# Patient Record
Sex: MALE | Race: ASIAN | URBAN - METROPOLITAN AREA
[De-identification: names, ages, dates, MRNs, and addresses within clinical notes are randomized per-mention and may not be internally consistent; named-entity substitution may affect disease eponyms.]

---

## 2017-07-31 ENCOUNTER — APPOINTMENT (OUTPATIENT)
Age: 67
Setting detail: DERMATOLOGY
End: 2017-07-31

## 2017-07-31 DIAGNOSIS — D22 MELANOCYTIC NEVI: ICD-10-CM

## 2017-07-31 DIAGNOSIS — B07.8 OTHER VIRAL WARTS: ICD-10-CM

## 2017-07-31 DIAGNOSIS — Z71.89 OTHER SPECIFIED COUNSELING: ICD-10-CM

## 2017-07-31 DIAGNOSIS — L82.1 OTHER SEBORRHEIC KERATOSIS: ICD-10-CM

## 2017-07-31 PROBLEM — D22.5 MELANOCYTIC NEVI OF TRUNK: Status: ACTIVE | Noted: 2017-07-31

## 2017-07-31 PROBLEM — D22.72 MELANOCYTIC NEVI OF LEFT LOWER LIMB, INCLUDING HIP: Status: ACTIVE | Noted: 2017-07-31

## 2017-07-31 PROCEDURE — OTHER LIQUID NITROGEN: OTHER

## 2017-07-31 PROCEDURE — OTHER COUNSELING: OTHER

## 2017-07-31 PROCEDURE — 17110 DESTRUCT B9 LESION 1-14: CPT

## 2017-07-31 PROCEDURE — 99213 OFFICE O/P EST LOW 20 MIN: CPT | Mod: 25

## 2017-07-31 ASSESSMENT — LOCATION ZONE DERM
LOCATION ZONE: ARM
LOCATION ZONE: LEG
LOCATION ZONE: NECK
LOCATION ZONE: TRUNK
LOCATION ZONE: FACE

## 2017-07-31 ASSESSMENT — LOCATION DETAILED DESCRIPTION DERM
LOCATION DETAILED: LEFT CLAVICULAR NECK
LOCATION DETAILED: LEFT INFERIOR CENTRAL MALAR CHEEK
LOCATION DETAILED: INFERIOR THORACIC SPINE
LOCATION DETAILED: LEFT DISTAL DORSAL FOREARM
LOCATION DETAILED: LEFT DISTAL PRETIBIAL REGION
LOCATION DETAILED: LEFT SUPERIOR MEDIAL UPPER BACK
LOCATION DETAILED: EPIGASTRIC SKIN

## 2017-07-31 ASSESSMENT — LOCATION SIMPLE DESCRIPTION DERM
LOCATION SIMPLE: UPPER BACK
LOCATION SIMPLE: LEFT ANTERIOR NECK
LOCATION SIMPLE: LEFT PRETIBIAL REGION
LOCATION SIMPLE: LEFT FOREARM
LOCATION SIMPLE: LEFT UPPER BACK
LOCATION SIMPLE: ABDOMEN
LOCATION SIMPLE: LEFT CHEEK

## 2017-07-31 NOTE — PROCEDURE: LIQUID NITROGEN
Medical Necessity Information: It is in your best interest to select a reason for this procedure from the list below. All of these items fulfill various CMS LCD requirements except the new and changing color options.
Detail Level: Simple
Number Of Freeze-Thaw Cycles: 2 freeze-thaw cycles
Add 52 Modifier (Optional): no
Medical Necessity Clause: This procedure was medically necessary because the lesions that were treated were: irritated
Duration Of Freeze Thaw-Cycle (Seconds): 5
Post-Care Instructions: I reviewed with the patient in detail post-care instructions. Patient is to wear sunprotection, and avoid picking at any of the treated lesions. Pt may apply Vaseline to crusted or scabbing areas.
Include Z78.9 (Other Specified Conditions Influencing Health Status) As An Associated Diagnosis?: Yes
Consent: The patient's consent was obtained including but not limited to risks of crusting, scabbing, blistering, scarring, darker or lighter pigmentary change, recurrence, incomplete removal and infection.

## 2022-02-11 ENCOUNTER — OFFICE VISIT (OUTPATIENT)
Dept: URBAN - METROPOLITAN AREA CLINIC 13 | Facility: CLINIC | Age: 72
End: 2022-02-11
Payer: MEDICARE

## 2022-02-11 DIAGNOSIS — H44.23 DEGENERATIVE MYOPIA, BILATERAL: ICD-10-CM

## 2022-02-11 DIAGNOSIS — Z96.1 PRESENCE OF INTRAOCULAR LENS: ICD-10-CM

## 2022-02-11 DIAGNOSIS — H35.051 RETINAL NEOVASCULARIZATION, UNSPECIFIED, RIGHT EYE: Primary | ICD-10-CM

## 2022-02-11 PROCEDURE — 67028 INJECTION EYE DRUG: CPT | Performed by: OPHTHALMOLOGY

## 2022-02-11 PROCEDURE — 99203 OFFICE O/P NEW LOW 30 MIN: CPT | Performed by: OPHTHALMOLOGY

## 2022-02-11 PROCEDURE — 92134 CPTRZ OPH DX IMG PST SGM RTA: CPT | Performed by: OPHTHALMOLOGY

## 2022-02-11 ASSESSMENT — INTRAOCULAR PRESSURE
OD: 20
OS: 21

## 2022-02-11 NOTE — IMPRESSION/PLAN
Impression: Retinal neovascularization OD Plan: Pt c/o 'smudge' in his vision for several weeks. 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 Discussed Dx and NHx at length. Reviewed RBA of obs vs Lucentis series at length, pt elects to proceed with Lucentis (sample) today. 

4 weeks, photos/FA/ICG OU, Lucentis OD 2/3

## 2022-03-11 ENCOUNTER — OFFICE VISIT (OUTPATIENT)
Dept: URBAN - METROPOLITAN AREA CLINIC 13 | Facility: CLINIC | Age: 72
End: 2022-03-11
Payer: MEDICARE

## 2022-03-11 DIAGNOSIS — H35.81 RETINAL EDEMA: ICD-10-CM

## 2022-03-11 PROCEDURE — 92235 FLUORESCEIN ANGRPH MLTIFRAME: CPT | Performed by: OPHTHALMOLOGY

## 2022-03-11 PROCEDURE — 92134 CPTRZ OPH DX IMG PST SGM RTA: CPT | Performed by: OPHTHALMOLOGY

## 2022-03-11 PROCEDURE — 67028 INJECTION EYE DRUG: CPT | Performed by: OPHTHALMOLOGY

## 2022-03-11 ASSESSMENT — INTRAOCULAR PRESSURE
OD: 15
OS: 18

## 2022-03-11 NOTE — IMPRESSION/PLAN
Impression: Retinal neovascularization OD Plan: Pt returns for ancillary testing and Tx. Photos 3/11/22 showed myopic CNVM with heme/SHRM/IRF OD. 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 ICG showed classic CNVM. Proceed with Lucentis 2/3 today. 

4 weeks, Lucentis OD 3/3

## 2022-04-08 ENCOUNTER — PROCEDURE (OUTPATIENT)
Dept: URBAN - METROPOLITAN AREA CLINIC 13 | Facility: CLINIC | Age: 72
End: 2022-04-08
Payer: MEDICARE

## 2022-04-08 PROCEDURE — 67028 INJECTION EYE DRUG: CPT | Performed by: OPHTHALMOLOGY

## 2022-04-08 ASSESSMENT — INTRAOCULAR PRESSURE
OD: 16
OS: 17

## 2022-05-20 ENCOUNTER — OFFICE VISIT (OUTPATIENT)
Dept: URBAN - METROPOLITAN AREA CLINIC 13 | Facility: CLINIC | Age: 72
End: 2022-05-20
Payer: MEDICARE

## 2022-05-20 DIAGNOSIS — H35.81 RETINAL EDEMA: ICD-10-CM

## 2022-05-20 DIAGNOSIS — H35.051 RETINAL NEOVASCULARIZATION, UNSPECIFIED, RIGHT EYE: Primary | ICD-10-CM

## 2022-05-20 DIAGNOSIS — H44.23 DEGENERATIVE MYOPIA, BILATERAL: ICD-10-CM

## 2022-05-20 PROCEDURE — 99212 OFFICE O/P EST SF 10 MIN: CPT | Performed by: OPHTHALMOLOGY

## 2022-05-20 PROCEDURE — 92134 CPTRZ OPH DX IMG PST SGM RTA: CPT | Performed by: OPHTHALMOLOGY

## 2022-05-20 PROCEDURE — 67028 INJECTION EYE DRUG: CPT | Performed by: OPHTHALMOLOGY

## 2022-05-20 ASSESSMENT — INTRAOCULAR PRESSURE
OS: 17
OD: 16

## 2022-05-20 NOTE — IMPRESSION/PLAN
Impression: Retinal neovascularization OD Plan: Exam/OCT show myopic CNVM with resolved heme/IRF OD. Photos 3/11/22 showed myopic CNVM with heme/SHRM/IRF OD. 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 ICG showed classic CNVM. Reviewed options of close obs vs T+E, pt elects T+E to prevent recurrence.  

8 weeks, OCT/Lucentis OD

## 2022-07-15 ENCOUNTER — PROCEDURE (OUTPATIENT)
Dept: URBAN - METROPOLITAN AREA CLINIC 13 | Facility: CLINIC | Age: 72
End: 2022-07-15
Payer: MEDICARE

## 2022-07-15 DIAGNOSIS — H35.051 RETINAL NEOVASCULARIZATION, UNSPECIFIED, RIGHT EYE: Primary | ICD-10-CM

## 2022-07-15 PROCEDURE — 92134 CPTRZ OPH DX IMG PST SGM RTA: CPT | Performed by: OPHTHALMOLOGY

## 2022-07-15 PROCEDURE — 67028 INJECTION EYE DRUG: CPT | Performed by: OPHTHALMOLOGY

## 2022-07-15 ASSESSMENT — INTRAOCULAR PRESSURE
OD: 14
OS: 15

## 2022-09-23 ENCOUNTER — OFFICE VISIT (OUTPATIENT)
Dept: URBAN - METROPOLITAN AREA CLINIC 13 | Facility: CLINIC | Age: 72
End: 2022-09-23
Payer: MEDICARE

## 2022-09-23 DIAGNOSIS — Z96.1 PRESENCE OF INTRAOCULAR LENS: ICD-10-CM

## 2022-09-23 DIAGNOSIS — H35.051 RETINAL NEOVASCULARIZATION, UNSPECIFIED, RIGHT EYE: Primary | ICD-10-CM

## 2022-09-23 DIAGNOSIS — H44.23 DEGENERATIVE MYOPIA, BILATERAL: ICD-10-CM

## 2022-09-23 DIAGNOSIS — H35.81 RETINAL EDEMA: ICD-10-CM

## 2022-09-23 PROCEDURE — 92014 COMPRE OPH EXAM EST PT 1/>: CPT | Performed by: OPHTHALMOLOGY

## 2022-09-23 PROCEDURE — 67028 INJECTION EYE DRUG: CPT | Performed by: OPHTHALMOLOGY

## 2022-09-23 PROCEDURE — 92134 CPTRZ OPH DX IMG PST SGM RTA: CPT | Performed by: OPHTHALMOLOGY

## 2022-09-23 ASSESSMENT — INTRAOCULAR PRESSURE
OS: 19
OD: 20

## 2022-09-23 NOTE — IMPRESSION/PLAN
Impression: Retinal neovascularization OD Plan: Exam/OCT show myopic CNVM without recurrent heme/IRF OD. Photos 3/11/22 showed myopic CNVM with heme/SHRM/IRF OD. 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 ICG showed classic CNVM. Reviewed options of close obs vs T+E, pt elects T+E to prevent recurrence.  

12 weeks, DFE/OCT OU, Lucentis OD

## 2022-12-16 ENCOUNTER — OFFICE VISIT (OUTPATIENT)
Dept: URBAN - METROPOLITAN AREA CLINIC 13 | Facility: CLINIC | Age: 72
End: 2022-12-16
Payer: MEDICARE

## 2022-12-16 DIAGNOSIS — H35.81 RETINAL EDEMA: ICD-10-CM

## 2022-12-16 DIAGNOSIS — H44.23 DEGENERATIVE MYOPIA, BILATERAL: ICD-10-CM

## 2022-12-16 DIAGNOSIS — H35.051 RETINAL NEOVASCULARIZATION, UNSPECIFIED, RIGHT EYE: Primary | ICD-10-CM

## 2022-12-16 PROCEDURE — 92134 CPTRZ OPH DX IMG PST SGM RTA: CPT | Performed by: OPHTHALMOLOGY

## 2022-12-16 PROCEDURE — 67028 INJECTION EYE DRUG: CPT | Performed by: OPHTHALMOLOGY

## 2022-12-16 PROCEDURE — 99212 OFFICE O/P EST SF 10 MIN: CPT | Performed by: OPHTHALMOLOGY

## 2022-12-16 ASSESSMENT — INTRAOCULAR PRESSURE
OS: 18
OD: 19

## 2022-12-16 NOTE — IMPRESSION/PLAN
Impression: Retinal neovascularization OD Plan: Exam/OCT show myopic CNVM without recurrent heme/IRF OD. Photos 3/11/22 showed myopic CNVM with heme/SHRM/IRF OD. 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 ICG showed classic CNVM. Recommend q12 week maint Lucentis OD to prevent recurrence/VA loss.  

12 weeks, colors/FA/ICG/OCT OU, Lucentis OD

## 2023-03-10 ENCOUNTER — OFFICE VISIT (OUTPATIENT)
Dept: URBAN - METROPOLITAN AREA CLINIC 13 | Facility: CLINIC | Age: 73
End: 2023-03-10
Payer: MEDICARE

## 2023-03-10 DIAGNOSIS — Z96.1 PRESENCE OF INTRAOCULAR LENS: ICD-10-CM

## 2023-03-10 DIAGNOSIS — H44.23 DEGENERATIVE MYOPIA, BILATERAL: ICD-10-CM

## 2023-03-10 DIAGNOSIS — H35.81 RETINAL EDEMA: ICD-10-CM

## 2023-03-10 DIAGNOSIS — H35.051 RETINAL NEOVASCULARIZATION, UNSPECIFIED, RIGHT EYE: Primary | ICD-10-CM

## 2023-03-10 PROCEDURE — 92014 COMPRE OPH EXAM EST PT 1/>: CPT | Performed by: OPHTHALMOLOGY

## 2023-03-10 PROCEDURE — 92250 FUNDUS PHOTOGRAPHY W/I&R: CPT | Performed by: OPHTHALMOLOGY

## 2023-03-10 PROCEDURE — 67028 INJECTION EYE DRUG: CPT | Performed by: OPHTHALMOLOGY

## 2023-03-10 PROCEDURE — 92134 CPTRZ OPH DX IMG PST SGM RTA: CPT | Performed by: OPHTHALMOLOGY

## 2023-03-10 PROCEDURE — 92242 FLUORESCEIN&ICG ANGIOGRAPHY: CPT | Performed by: OPHTHALMOLOGY

## 2023-03-10 ASSESSMENT — INTRAOCULAR PRESSURE
OD: 23
OS: 24

## 2023-03-10 NOTE — IMPRESSION/PLAN
Impression: Retinal neovascularization OD Plan: Exam/OCT show myopic CNVM without recurrent heme/IRF OD. Photos 3/10/23 showed myopic CNVM OD, myopic fundus OS. FA 3/10/23 showed staining of nasal PPCNVM in PM bundle OD, staining of myopic changes OS. ICG showed staining OD, normal flow OS. Recommend q12 week maint Lucentis OD to prevent recurrence/VA loss.  

12 weeks, DFE/OCT OU, Lucentis OD

## 2023-05-26 ENCOUNTER — OFFICE VISIT (OUTPATIENT)
Dept: URBAN - METROPOLITAN AREA CLINIC 13 | Facility: CLINIC | Age: 73
End: 2023-05-26
Payer: MEDICARE

## 2023-05-26 DIAGNOSIS — H44.23 DEGENERATIVE MYOPIA, BILATERAL: ICD-10-CM

## 2023-05-26 DIAGNOSIS — H35.81 RETINAL EDEMA: ICD-10-CM

## 2023-05-26 DIAGNOSIS — H35.051 RETINAL NEOVASCULARIZATION, UNSPECIFIED, RIGHT EYE: Primary | ICD-10-CM

## 2023-05-26 PROCEDURE — 92012 INTRM OPH EXAM EST PATIENT: CPT | Performed by: OPHTHALMOLOGY

## 2023-05-26 PROCEDURE — 67028 INJECTION EYE DRUG: CPT | Performed by: OPHTHALMOLOGY

## 2023-05-26 PROCEDURE — 92134 CPTRZ OPH DX IMG PST SGM RTA: CPT | Performed by: OPHTHALMOLOGY

## 2023-05-26 ASSESSMENT — INTRAOCULAR PRESSURE
OS: 16
OD: 20

## 2023-08-25 ENCOUNTER — OFFICE VISIT (OUTPATIENT)
Dept: URBAN - METROPOLITAN AREA CLINIC 13 | Facility: CLINIC | Age: 73
End: 2023-08-25
Payer: MEDICARE

## 2023-08-25 DIAGNOSIS — H35.81 RETINAL EDEMA: ICD-10-CM

## 2023-08-25 DIAGNOSIS — H44.23 DEGENERATIVE MYOPIA, BILATERAL: ICD-10-CM

## 2023-08-25 DIAGNOSIS — H35.051 RETINAL NEOVASCULARIZATION, UNSPECIFIED, RIGHT EYE: Primary | ICD-10-CM

## 2023-08-25 PROCEDURE — 99213 OFFICE O/P EST LOW 20 MIN: CPT | Performed by: STUDENT IN AN ORGANIZED HEALTH CARE EDUCATION/TRAINING PROGRAM

## 2023-08-25 PROCEDURE — 67028 INJECTION EYE DRUG: CPT | Performed by: STUDENT IN AN ORGANIZED HEALTH CARE EDUCATION/TRAINING PROGRAM

## 2023-08-25 PROCEDURE — 92134 CPTRZ OPH DX IMG PST SGM RTA: CPT | Performed by: STUDENT IN AN ORGANIZED HEALTH CARE EDUCATION/TRAINING PROGRAM

## 2023-08-25 ASSESSMENT — INTRAOCULAR PRESSURE
OD: 23
OS: 20

## 2023-12-22 ENCOUNTER — OFFICE VISIT (OUTPATIENT)
Dept: URBAN - METROPOLITAN AREA CLINIC 13 | Facility: CLINIC | Age: 73
End: 2023-12-22
Payer: MEDICARE

## 2023-12-22 DIAGNOSIS — H35.81 RETINAL EDEMA: ICD-10-CM

## 2023-12-22 DIAGNOSIS — Z96.1 PRESENCE OF INTRAOCULAR LENS: ICD-10-CM

## 2023-12-22 PROCEDURE — 92134 CPTRZ OPH DX IMG PST SGM RTA: CPT | Performed by: STUDENT IN AN ORGANIZED HEALTH CARE EDUCATION/TRAINING PROGRAM

## 2023-12-22 PROCEDURE — 99214 OFFICE O/P EST MOD 30 MIN: CPT | Performed by: STUDENT IN AN ORGANIZED HEALTH CARE EDUCATION/TRAINING PROGRAM

## 2023-12-22 ASSESSMENT — INTRAOCULAR PRESSURE
OD: 18
OS: 15

## 2024-02-16 ENCOUNTER — OFFICE VISIT (OUTPATIENT)
Dept: URBAN - METROPOLITAN AREA CLINIC 13 | Facility: CLINIC | Age: 74
End: 2024-02-16
Payer: MEDICARE

## 2024-02-16 DIAGNOSIS — H35.051 RETINAL NEOVASCULARIZATION, UNSPECIFIED, RIGHT EYE: Primary | ICD-10-CM

## 2024-02-16 DIAGNOSIS — H44.23 DEGENERATIVE MYOPIA, BILATERAL: ICD-10-CM

## 2024-02-16 PROCEDURE — 92134 CPTRZ OPH DX IMG PST SGM RTA: CPT | Performed by: STUDENT IN AN ORGANIZED HEALTH CARE EDUCATION/TRAINING PROGRAM

## 2024-02-16 PROCEDURE — 99214 OFFICE O/P EST MOD 30 MIN: CPT | Performed by: STUDENT IN AN ORGANIZED HEALTH CARE EDUCATION/TRAINING PROGRAM

## 2024-02-16 ASSESSMENT — INTRAOCULAR PRESSURE
OS: 17
OD: 18

## 2025-04-23 ENCOUNTER — OFFICE VISIT (OUTPATIENT)
Dept: URBAN - METROPOLITAN AREA CLINIC 7 | Facility: CLINIC | Age: 75
End: 2025-04-23
Payer: MEDICARE

## 2025-04-23 DIAGNOSIS — Z96.1 PRESENCE OF INTRAOCULAR LENS: ICD-10-CM

## 2025-04-23 DIAGNOSIS — H44.23 DEGENERATIVE MYOPIA, BILATERAL: ICD-10-CM

## 2025-04-23 DIAGNOSIS — H35.81 RETINAL EDEMA: ICD-10-CM

## 2025-04-23 DIAGNOSIS — H35.051 RETINAL NEOVASCULARIZATION, UNSPECIFIED, RIGHT EYE: Primary | ICD-10-CM

## 2025-04-23 PROCEDURE — 99214 OFFICE O/P EST MOD 30 MIN: CPT | Performed by: STUDENT IN AN ORGANIZED HEALTH CARE EDUCATION/TRAINING PROGRAM

## 2025-04-23 PROCEDURE — 92134 CPTRZ OPH DX IMG PST SGM RTA: CPT | Performed by: STUDENT IN AN ORGANIZED HEALTH CARE EDUCATION/TRAINING PROGRAM

## 2025-04-23 ASSESSMENT — INTRAOCULAR PRESSURE
OS: 22
OD: 19